# Patient Record
Sex: FEMALE | Race: ASIAN | NOT HISPANIC OR LATINO | URBAN - METROPOLITAN AREA
[De-identification: names, ages, dates, MRNs, and addresses within clinical notes are randomized per-mention and may not be internally consistent; named-entity substitution may affect disease eponyms.]

---

## 2020-10-15 ENCOUNTER — EMERGENCY (EMERGENCY)
Facility: HOSPITAL | Age: 85
LOS: 1 days | Discharge: HOME | End: 2020-10-15
Attending: EMERGENCY MEDICINE
Payer: MEDICARE

## 2020-10-15 VITALS
HEART RATE: 87 BPM | RESPIRATION RATE: 16 BRPM | DIASTOLIC BLOOD PRESSURE: 77 MMHG | TEMPERATURE: 98 F | WEIGHT: 117.95 LBS | OXYGEN SATURATION: 99 % | SYSTOLIC BLOOD PRESSURE: 141 MMHG

## 2020-10-15 LAB
ALBUMIN SERPL ELPH-MCNC: 4.7 G/DL — SIGNIFICANT CHANGE UP (ref 3.3–5)
ALP SERPL-CCNC: 101 U/L — SIGNIFICANT CHANGE UP (ref 40–120)
ALT FLD-CCNC: 14 U/L — SIGNIFICANT CHANGE UP (ref 10–45)
ANION GAP SERPL CALC-SCNC: 11 MMOL/L — SIGNIFICANT CHANGE UP (ref 5–17)
APTT BLD: 32.1 SEC — SIGNIFICANT CHANGE UP (ref 27.5–35.5)
AST SERPL-CCNC: 20 U/L — SIGNIFICANT CHANGE UP (ref 10–40)
BASOPHILS # BLD AUTO: 0.04 K/UL — SIGNIFICANT CHANGE UP (ref 0–0.2)
BASOPHILS NFR BLD AUTO: 0.5 % — SIGNIFICANT CHANGE UP (ref 0–2)
BILIRUB SERPL-MCNC: 0.4 MG/DL — SIGNIFICANT CHANGE UP (ref 0.2–1.2)
BUN SERPL-MCNC: 19 MG/DL — SIGNIFICANT CHANGE UP (ref 7–23)
CALCIUM SERPL-MCNC: 10.3 MG/DL — SIGNIFICANT CHANGE UP (ref 8.4–10.5)
CHLORIDE SERPL-SCNC: 101 MMOL/L — SIGNIFICANT CHANGE UP (ref 96–108)
CO2 SERPL-SCNC: 28 MMOL/L — SIGNIFICANT CHANGE UP (ref 22–31)
CREAT SERPL-MCNC: 0.79 MG/DL — SIGNIFICANT CHANGE UP (ref 0.5–1.3)
EOSINOPHIL # BLD AUTO: 0.08 K/UL — SIGNIFICANT CHANGE UP (ref 0–0.5)
EOSINOPHIL NFR BLD AUTO: 1 % — SIGNIFICANT CHANGE UP (ref 0–6)
GLUCOSE SERPL-MCNC: 111 MG/DL — HIGH (ref 70–99)
HCT VFR BLD CALC: 43.2 % — SIGNIFICANT CHANGE UP (ref 34.5–45)
HGB BLD-MCNC: 14.2 G/DL — SIGNIFICANT CHANGE UP (ref 11.5–15.5)
IMM GRANULOCYTES NFR BLD AUTO: 0.3 % — SIGNIFICANT CHANGE UP (ref 0–1.5)
INR BLD: 0.94 RATIO — SIGNIFICANT CHANGE UP (ref 0.88–1.16)
LYMPHOCYTES # BLD AUTO: 2 K/UL — SIGNIFICANT CHANGE UP (ref 1–3.3)
LYMPHOCYTES # BLD AUTO: 25.3 % — SIGNIFICANT CHANGE UP (ref 13–44)
MCHC RBC-ENTMCNC: 30 PG — SIGNIFICANT CHANGE UP (ref 27–34)
MCHC RBC-ENTMCNC: 32.9 GM/DL — SIGNIFICANT CHANGE UP (ref 32–36)
MCV RBC AUTO: 91.1 FL — SIGNIFICANT CHANGE UP (ref 80–100)
MONOCYTES # BLD AUTO: 0.61 K/UL — SIGNIFICANT CHANGE UP (ref 0–0.9)
MONOCYTES NFR BLD AUTO: 7.7 % — SIGNIFICANT CHANGE UP (ref 2–14)
NEUTROPHILS # BLD AUTO: 5.15 K/UL — SIGNIFICANT CHANGE UP (ref 1.8–7.4)
NEUTROPHILS NFR BLD AUTO: 65.2 % — SIGNIFICANT CHANGE UP (ref 43–77)
NRBC # BLD: 0 /100 WBCS — SIGNIFICANT CHANGE UP (ref 0–0)
PLATELET # BLD AUTO: 197 K/UL — SIGNIFICANT CHANGE UP (ref 150–400)
POTASSIUM SERPL-MCNC: 3.9 MMOL/L — SIGNIFICANT CHANGE UP (ref 3.5–5.3)
POTASSIUM SERPL-SCNC: 3.9 MMOL/L — SIGNIFICANT CHANGE UP (ref 3.5–5.3)
PROT SERPL-MCNC: 8.8 G/DL — HIGH (ref 6–8.3)
PROTHROM AB SERPL-ACNC: 11.3 SEC — SIGNIFICANT CHANGE UP (ref 10.6–13.6)
RBC # BLD: 4.74 M/UL — SIGNIFICANT CHANGE UP (ref 3.8–5.2)
RBC # FLD: 12.9 % — SIGNIFICANT CHANGE UP (ref 10.3–14.5)
SODIUM SERPL-SCNC: 140 MMOL/L — SIGNIFICANT CHANGE UP (ref 135–145)
TROPONIN T, HIGH SENSITIVITY RESULT: 7 NG/L — SIGNIFICANT CHANGE UP (ref 0–51)
WBC # BLD: 7.9 K/UL — SIGNIFICANT CHANGE UP (ref 3.8–10.5)
WBC # FLD AUTO: 7.9 K/UL — SIGNIFICANT CHANGE UP (ref 3.8–10.5)

## 2020-10-15 PROCEDURE — 70450 CT HEAD/BRAIN W/O DYE: CPT | Mod: 26

## 2020-10-15 PROCEDURE — 99285 EMERGENCY DEPT VISIT HI MDM: CPT

## 2020-10-15 PROCEDURE — 93010 ELECTROCARDIOGRAM REPORT: CPT

## 2020-10-15 PROCEDURE — 71250 CT THORAX DX C-: CPT | Mod: 26

## 2020-10-15 RX ORDER — ACETAMINOPHEN 500 MG
650 TABLET ORAL ONCE
Refills: 0 | Status: COMPLETED | OUTPATIENT
Start: 2020-10-15 | End: 2020-10-15

## 2020-10-15 NOTE — ED PROVIDER NOTE - PATIENT PORTAL LINK FT
You can access the FollowMyHealth Patient Portal offered by [facility] by registering at the following website: [email or web address]. By joining [facility]’s FollowMyHealth portal, you will also be able to view your health information using other applications (apps) compatible with our system.

## 2020-10-15 NOTE — ED PROVIDER NOTE - PHYSICAL EXAMINATION
General: NAD, good hygiene, well developed  HENT: Atraumatic, EOMI, no conjunctivae injection, moist mucosa.  Neck: normal ROM and trachea midline   Cardiovascular: RRR, focal tenderness on the left chest, no crepitus or hematoma on exam, radial pulses equal and b/l  Respiratory: CTABL, no wheezes or crackles, no decreased breath sounds  Abdominal: soft and non-tender non distended, neg for guarding, no CVA tenderness   Extremities: no edema of the legs/feet, DP/PT equal b/l  Skin: warm, well perfused  Neurologic: nonfocal, AAOx3  Psych: normal mood and affect

## 2020-10-15 NOTE — ED PROVIDER NOTE - OBJECTIVE STATEMENT
86F, h.o Bradycardiac (pacemaker), CAD (plavix), p.w left sided chest pain worse with speaking and movement s/p fall from swing 4 days ago. Patient states the chest pain worse throughout the week. Denied shortness of breath, n/v, headache, vision changes, abdominal pain.

## 2020-10-15 NOTE — ED ADULT NURSE NOTE - OBJECTIVE STATEMENT
87 yo female presents to the ED with granddaughter at bedside with c/o Left side anterior wall chest pain. Pt had mechanical fall 5 days ago off swing and hit her chest on ground, negative head trauma, negative LOC, + blood thinners, ambulatory after the incident, denies syncope before and after incident. Upon assessment, Neuro status intact, no ecchymosis, lacs, or abrasions to left anterior chest wall. Pt states pain worsens with movement. Pt is calm in bed denies SOB, fever, chills, back pain.

## 2020-10-15 NOTE — ED PROVIDER NOTE - PROGRESS NOTE DETAILS
Ankit Ricks, PGY 3: bedside pocus showed no pneumo Ankit Ricks, PGY 3: ct showed left rib fx 5-6, no pneumo, will pain control and provided with incentive spirometer. Pulling at 1 L. will get oxycodone for pain control and reassess. possible trauma eval and admission,. Ankit Ricks, PGY 3: spoke with surgery/trauma and will clear the patient. instructed the patient to use incentive spirometer daily. Pain have improved with oxycodone.

## 2020-10-15 NOTE — ED ADULT NURSE NOTE - NSIMPLEMENTINTERV_GEN_ALL_ED
Implemented All Fall with Harm Risk Interventions:  [place] to call system. Call bell, personal items and telephone within reach. Instruct patient to call for assistance. Room bathroom lighting operational. Non-slip footwear when patient is off stretcher. Physically safe environment: no spills, clutter or unnecessary equipment. Stretcher in lowest position, wheels locked, appropriate side rails in place. Provide visual cue, wrist band, yellow gown, etc. Monitor gait and stability. Monitor for mental status changes and reorient to person, place, and time. Review medications for side effects contributing to fall risk. Reinforce activity limits and safety measures with patient and family. Provide visual clues: red socks.

## 2020-10-15 NOTE — ED PROVIDER NOTE - NSFOLLOWUPINSTRUCTIONS_ED_ALL_ED_FT
Thank you for visiting our Emergency Department, it has been a pleasure taking part in your healthcare. Please follow up with your primary doctor within x48 hours.    Your discharge diagnosis is: left rib fracture 5th-6th   please take over the counter pain medication motrin (600mg every 6hours) and tylenol (650mg every 4hours).   Please take oxycodone only for break through pain control.   Follow up with your primary care doctor.   Use incentive spirometer daily and come back if the the pain is uncontrolled or develop any fever or change in breathing.

## 2020-10-15 NOTE — ED PROCEDURE NOTE - PROCEDURE ADDITIONAL DETAILS
Emergency Department Focused Ultrasound performed at patient's bedside for educational purposes. The study will have a follow up study performed.   POCUS echo was performed as well no pericardial effusion.

## 2020-10-15 NOTE — ED PROVIDER NOTE - ATTENDING CONTRIBUTION TO CARE
Attending MD Keenan: I personally have seen and examined this patient.  Resident note reviewed and agree on plan of care and except where noted.  See below for details.     Seen in [place] 4[place], accompanied by daughter, declined telephone , requested daughter for Cantonese interpretation    86F with PMH/PSH including CAD s/p stent on ASA, Plavix, HTN on Isosorbide, HLD on Atorvastatin presents to the ED with L chest pain s/p fall.  Reports that she attempted to get on a swing while at the playground with her granddaughter on Sunday and missed the swing, falling forward.  Reports that because she was trying to grab on to the chains of the swing, she did not brace herself with her hands when she fell forward.  Reports that she hit the L side of her chest.  Denies preceding dizziness, weakness, sensory changes.  Denies LOC, hitting head.  Denies change in vision, double vision, sudden loss of vision. Reports pain with deep inspiration, valsalva, denies pain with palpation, denies shortness of breath, palpitations.  Denies abdominal pain, nausea, vomiting, diarrhea, blood in stools. Denies dysuria, hematuria, change in urinary habits including frequency, urgency. Denies loss of urinary or bowel continence. Denies numbness, weakness or tingling in extremities. Denies change in vision, double vision, sudden loss of vision.  Reports took Tylenol without improvement. A ten (10) point review of systems was negative other than as stated in the HPI or elsewhere in the chart.     Exam:   General: NAD  HENT: head NCAT, airway patent with moist mucous membranes  Eyes: PERRL  Lungs: lungs CTAB with good inspiratory effort, no wheezing, no rhonchi, no rales  Cardiac: +S1S2, no m/r/g, +PM in the L upper chest, no reproducibile tenderness to palpation, no crepitus, no ecchymosis  GI: abdomen soft with +BS, NT, ND  : no CVAT  MSK: FROM at neck, no tenderness to midline palpation, no stepoffs along length of spine, no calf tenderness, swelling, erythema or warmth  Neuro: moving all extremities with 5/5 strength bilateral upper and lower extremities, sensory grossly intact, no gross neuro deficits  Skin: warm and dry    A/P: 86F with L sided chest pain s/p fall onto chest on Sunday, DDx includes occult rib fracture, history and clinical picture not consistent with infectious process like PNA, will obtain labs, including trop, CT chest, EKG, reassess

## 2020-10-15 NOTE — ED PROVIDER NOTE - CLINICAL SUMMARY MEDICAL DECISION MAKING FREE TEXT BOX
86F, h.o bradycardiac (ppm), CAD (plavix), p.w left sided chest pain s/p falling on chest from swing 4 days ago. no hematoma or crepitus, focal tenderness on exam. will get acs, ct chest for rib fx, pain control, ekg. ct head for sdh or epidural despite no neuro symptoms or head injury. dispo pending labs and advance imaging.

## 2020-10-16 VITALS
SYSTOLIC BLOOD PRESSURE: 147 MMHG | TEMPERATURE: 97 F | OXYGEN SATURATION: 95 % | DIASTOLIC BLOOD PRESSURE: 72 MMHG | RESPIRATION RATE: 16 BRPM | HEART RATE: 68 BPM

## 2020-10-16 LAB — TROPONIN T, HIGH SENSITIVITY RESULT: 6 NG/L — SIGNIFICANT CHANGE UP (ref 0–51)

## 2020-10-16 PROCEDURE — 71250 CT THORAX DX C-: CPT

## 2020-10-16 PROCEDURE — 85610 PROTHROMBIN TIME: CPT

## 2020-10-16 PROCEDURE — 99284 EMERGENCY DEPT VISIT MOD MDM: CPT | Mod: 25

## 2020-10-16 PROCEDURE — 70450 CT HEAD/BRAIN W/O DYE: CPT

## 2020-10-16 PROCEDURE — 93005 ELECTROCARDIOGRAM TRACING: CPT

## 2020-10-16 PROCEDURE — 85730 THROMBOPLASTIN TIME PARTIAL: CPT

## 2020-10-16 PROCEDURE — 80053 COMPREHEN METABOLIC PANEL: CPT

## 2020-10-16 PROCEDURE — 85025 COMPLETE CBC W/AUTO DIFF WBC: CPT

## 2020-10-16 PROCEDURE — 96374 THER/PROPH/DIAG INJ IV PUSH: CPT

## 2020-10-16 PROCEDURE — 96375 TX/PRO/DX INJ NEW DRUG ADDON: CPT

## 2020-10-16 PROCEDURE — 84484 ASSAY OF TROPONIN QUANT: CPT

## 2020-10-16 RX ORDER — OXYCODONE HYDROCHLORIDE 5 MG/1
5 TABLET ORAL ONCE
Refills: 0 | Status: DISCONTINUED | OUTPATIENT
Start: 2020-10-16 | End: 2020-10-16

## 2020-10-16 RX ORDER — OXYCODONE HYDROCHLORIDE 5 MG/1
1 TABLET ORAL
Qty: 9 | Refills: 0
Start: 2020-10-16 | End: 2020-10-18

## 2020-10-16 RX ORDER — KETOROLAC TROMETHAMINE 30 MG/ML
15 SYRINGE (ML) INJECTION ONCE
Refills: 0 | Status: DISCONTINUED | OUTPATIENT
Start: 2020-10-16 | End: 2020-10-16

## 2020-10-16 RX ORDER — ONDANSETRON 8 MG/1
4 TABLET, FILM COATED ORAL ONCE
Refills: 0 | Status: COMPLETED | OUTPATIENT
Start: 2020-10-16 | End: 2020-10-16

## 2020-10-16 RX ADMIN — Medication 15 MILLIGRAM(S): at 02:40

## 2020-10-16 RX ADMIN — OXYCODONE HYDROCHLORIDE 5 MILLIGRAM(S): 5 TABLET ORAL at 03:23

## 2020-10-16 RX ADMIN — ONDANSETRON 4 MILLIGRAM(S): 8 TABLET, FILM COATED ORAL at 03:23

## 2020-10-16 RX ADMIN — Medication 15 MILLIGRAM(S): at 03:10

## 2020-10-16 NOTE — CONSULT NOTE ADULT - SUBJECTIVE AND OBJECTIVE BOX
TRAUMA SURGERY CONSULT NOTE  --------------------------------------------------------------------------------------------    Patient is an 86 year old female with a PMH of bradycardia (PPM), and CAD who is presenting to the hospital after a fall.  She fell 5 days ago while trying to get onto a swing with her granddaughter.  Originally she was having pain on both sides of her chest, but now it is only on the left anterior wall with coughing or big breaths.  She denies having any other pain and never had LOC.    ROS: 10-system review is otherwise negative except HPI above.      PAST MEDICAL & SURGICAL HISTORY:    FAMILY HISTORY:    ALLERGIES: penicillin (Rash; Swelling)    CURRENT MEDICATIONS  MEDICATIONS (STANDING):   MEDICATIONS (PRN):  --------------------------------------------------------------------------------------------    Vitals:   T(C): 36.3 (10-16-20 @ 04:51), Max: 36.8 (10-16-20 @ 02:36)  HR: 68 (10-16-20 @ 04:51) (67 - 87)  BP: 147/72 (10-16-20 @ 04:51) (141/77 - 152/74)  RR: 16 (10-16-20 @ 04:51) (16 - 17)  SpO2: 95% (10-16-20 @ 04:51) (95% - 99%)  CAPILLARY BLOOD GLUCOSE        CAPILLARY BLOOD GLUCOSE      Weight (kg): 53.5 (10-15 @ 22:14)    PHYSICAL EXAM:   General: NAD, Lying in bed comfortably  Neuro: A+Ox3  HEENT: NC/AT  Cardio: Regular rate  Resp: Good effort, over 1L on IS  Thorax: Left anterior chest wall pain  GI/Abd: Soft, NT/ND,   Musculoskeletal: All 4 extremities moving spontaneously, pelvis stable  --------------------------------------------------------------------------------------------    LABS  CBC (10-15 @ 23:06)                              14.2                           7.90    )----------------(  197        65.2  % Neutrophils, 25.3  % Lymphocytes, ANC: 5.15                                43.2      BMP (10-15 @ 23:06)             140     |  101     |  19    		Ca++ --      Ca 10.3               ---------------------------------( 111<H>		Mg --                 3.9     |  28      |  0.79  			Ph --        LFTs (10-15 @ 23:06)      TPro 8.8<H> / Alb 4.7 / TBili 0.4 / DBili -- / AST 20 / ALT 14 / AlkPhos 101    Coags (10-15 @ 23:06)  aPTT 32.1 / INR 0.94 / PT 11.3          --------------------------------------------------------------------------------------------    MICROBIOLOGY      --------------------------------------------------------------------------------------------    IMAGING  CT Chest:  FINDINGS:    Evaluation of the solid abdominal organs is limited without contrast.    The visualized axilla and subcutaneous tissues are unremarkable. Enlarged thyroid gland with multiple hypodense nodules measuring up to 1 cm.    The tracheobronchial tree is patent centrally.  There is no mediastinal hematoma.  The great vessels are not enlarged.  There is no significant mediastinal or hilar adenopathy.    The heart is enlarged. The ICD battery pack is embedded in the left upper thoracic wall with pacemaker wires in the right atrium and right ventricle. There is no pericardial effusion.    Lungs: Dependent atelectatic changes at the lung bases.  Pleura: Unremarkable.    Bones: Age-indeterminate fracture of anterior left fifth and sixth ribs.  Subcutaneous tissues: Unremarkable.  Upper abdomen: [Unremarkable.]    IMPRESSION:    Age-indeterminate fractures of the anterior left fifth and sixth ribs. No pneumothorax or hemothorax.  Cardiomegaly.        ASSESSMENT: Patient is an 86 year old female with a PMH of bradycardia (PPM), and CAD who is presenting to the hospital after a fall with left 5th and 6th rib fractures    PLAN:   - Pain control with tylenol, motrin, oxycodone, and lidocaine patches  - Counseled patient on using Incentive spirometer and its importance in decreasing pneumonia  - May DC patient if pain adequately controlled  - Discussed with Attending    Austin Sweeney PGY4  ATP #[id number]

## 2020-10-16 NOTE — ED ADULT NURSE REASSESSMENT NOTE - NS ED NURSE REASSESS COMMENT FT1
Pt educated on use of incentive spirometer, return demonstration completed. Pt taking in 1L on incentive spirometer. Still has moderate complaint of pain with movement. PO Oxycodone to be given as per MD order.
